# Patient Record
Sex: MALE | Race: WHITE | NOT HISPANIC OR LATINO
[De-identification: names, ages, dates, MRNs, and addresses within clinical notes are randomized per-mention and may not be internally consistent; named-entity substitution may affect disease eponyms.]

---

## 2024-02-27 PROBLEM — Z00.00 ENCOUNTER FOR PREVENTIVE HEALTH EXAMINATION: Status: ACTIVE | Noted: 2024-02-27

## 2024-03-01 ENCOUNTER — APPOINTMENT (OUTPATIENT)
Dept: ORTHOPEDIC SURGERY | Facility: CLINIC | Age: 44
End: 2024-03-01
Payer: COMMERCIAL

## 2024-03-01 VITALS — HEIGHT: 71 IN | BODY MASS INDEX: 23.8 KG/M2 | WEIGHT: 170 LBS

## 2024-03-01 DIAGNOSIS — M62.838 OTHER MUSCLE SPASM: ICD-10-CM

## 2024-03-01 DIAGNOSIS — Z78.9 OTHER SPECIFIED HEALTH STATUS: ICD-10-CM

## 2024-03-01 PROCEDURE — 72050 X-RAY EXAM NECK SPINE 4/5VWS: CPT

## 2024-03-01 PROCEDURE — 99204 OFFICE O/P NEW MOD 45 MIN: CPT

## 2024-03-01 RX ORDER — MELOXICAM 15 MG/1
15 TABLET ORAL DAILY
Qty: 21 | Refills: 0 | Status: ACTIVE | COMMUNITY
Start: 2024-03-01 | End: 1900-01-01

## 2024-03-01 NOTE — ASSESSMENT
[FreeTextEntry1] : 43 M with neck pain, periscapular pain and left cervical radic PT  We will also provide a prescription for anti-inflammatories.  Discussed major side effects of medication including but not limited to gastritis and acute kidney injury.  He was instructed to take with food and to discontinue use if stomach or esophageal pain developed. Fu 6 weeks If pain persists MRI C spine

## 2024-03-01 NOTE — IMAGING
[de-identified] : Spine: Inspection/Palpation: No tenderness to palpation throughout Cervical/thoracic/lumbar spine. No bony stepoffs, No lesions.   Gait: Non-antalgic, able to perform bilateral toe and heel rise.       Neurologic: Bilateral upper extremities 5/5 Deltoid/Biceps/Triceps/ Wrist Flexion/Wrist Extension/ / Intrinsics Except   Sensation intact to light touch C5-T1   Biceps/Triceps/Brachioradialis Reflex within normal limits   Negative Braxton's,  No inverted brachioradialis reflex    X-ray Ap/Lateral/Flexion/Extension of cervical spine were viewed and interpreted. calcified nucchal ligament. Kyphotic posture.  Corrects with extension. mild degen changes at C5-6 and C6-7

## 2024-03-01 NOTE — HISTORY OF PRESENT ILLNESS
[Upper back] : upper back [5] : 5 [7] : 7 [de-identified] : 03/01/2024: 43 year old RHD male presenting with left sided low back pain that radiates to left lateral arm not traveling past elbow.  Symptoms have been present x 2 months with No known injury/fall. Has been experiencing sharp/burning and tight/throbbing pain that worsens with standing for extended time.  denies N/T, denies chnage in dexterity or fine motor skills. Has tried ice hot patches with some relief. Worse pain when standing, more in the evening.   No PmHx, NKDA Occupation: - Heating.  [] : no

## 2024-04-05 ENCOUNTER — APPOINTMENT (OUTPATIENT)
Dept: ORTHOPEDIC SURGERY | Facility: CLINIC | Age: 44
End: 2024-04-05

## 2024-04-12 ENCOUNTER — NON-APPOINTMENT (OUTPATIENT)
Age: 44
End: 2024-04-12

## 2024-05-10 ENCOUNTER — APPOINTMENT (OUTPATIENT)
Dept: ORTHOPEDIC SURGERY | Facility: CLINIC | Age: 44
End: 2024-05-10
Payer: COMMERCIAL

## 2024-05-10 VITALS — HEIGHT: 71 IN | WEIGHT: 170 LBS | BODY MASS INDEX: 23.8 KG/M2

## 2024-05-10 DIAGNOSIS — M50.90 CERVICAL DISC DISORDER, UNSPECIFIED, UNSPECIFIED CERVICAL REGION: ICD-10-CM

## 2024-05-10 PROCEDURE — 99214 OFFICE O/P EST MOD 30 MIN: CPT

## 2024-05-10 RX ORDER — GABAPENTIN 100 MG/1
100 CAPSULE ORAL
Qty: 42 | Refills: 4 | Status: ACTIVE | COMMUNITY
Start: 2024-05-10 | End: 1900-01-01

## 2024-05-10 NOTE — IMAGING
[Straightening consistent with spasm] : Straightening consistent with spasm [Disc space narrowing] : Disc space narrowing [de-identified] : CSPINE Inspection: No rash or ecchymosis Palpation: spasm and TTP in traps, rhomboids, paracervicals ROM: Limited all planes Strength: 5/5 bilateral deltoid, biceps, 4/5 Left triceps, 4/5 Left wrist flexors, wrist extensors, 4/5 Left , abductors Sensation: Sensation present to light touch bilateral C5-T1 distributions Reflexes: Negative Braxton's bilaterally  Bilateral Shoulders- Palpation: No tenderness to palpation ROM: R shoulder in adduction , L shoulder in adduction  Strength: Decent strength with rotator cuff testing Provocative maneuvers: Negative impingement testing  Left Elbow - negative tinel's

## 2024-05-10 NOTE — ASSESSMENT
[FreeTextEntry1] : Left upper extremity radiculopathy with weakness  Gabapentin- Patient advised of sedating effects, instructed not to drive, operate machinery, or take with other sedating medications. Advised of need to taper on/off medication and risk of abruptly stopping gabapentin.  Cervical MRI to eval HNP  f/u after MRI

## 2024-05-10 NOTE — HISTORY OF PRESENT ILLNESS
[de-identified] : 3/1/24 Dr. Reid note:   43 year old RHD male presenting with left sided low back pain that radiates to left lateral arm not traveling past elbow. Symptoms have been present x 2 months with No known injury/fall. Has been experiencing sharp/burning and tight/throbbing pain that worsens with standing for extended time. denies N/T, denies chnage in dexterity or fine motor skills. Has tried ice hot patches with some relief. Worse pain when standing, more in the evening. 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 Patient has numbness/tingling into the hand. Went to PT, doing HEP.  [] : no

## 2024-05-21 ENCOUNTER — APPOINTMENT (OUTPATIENT)
Dept: MRI IMAGING | Facility: CLINIC | Age: 44
End: 2024-05-21
Payer: COMMERCIAL

## 2024-05-21 PROCEDURE — 72141 MRI NECK SPINE W/O DYE: CPT

## 2024-05-29 ENCOUNTER — APPOINTMENT (OUTPATIENT)
Dept: ORTHOPEDIC SURGERY | Facility: CLINIC | Age: 44
End: 2024-05-29
Payer: COMMERCIAL

## 2024-05-29 VITALS — HEIGHT: 71 IN | BODY MASS INDEX: 23.8 KG/M2 | WEIGHT: 170 LBS

## 2024-05-29 DIAGNOSIS — M54.12 RADICULOPATHY, CERVICAL REGION: ICD-10-CM

## 2024-05-29 PROCEDURE — 99215 OFFICE O/P EST HI 40 MIN: CPT

## 2024-05-29 NOTE — IMAGING
[de-identified] : CSPINE Inspection: No rash or ecchymosis Palpation: spasm and TTP in traps, rhomboids, paracervicals ROM: Limited all planes Strength: 5/5 bilateral deltoid, biceps, 4/5 Left triceps, 4/5 Left wrist flexors, wrist extensors, 4/5 Left , abductors Sensation: Sensation present to light touch bilateral C5-T1 distributions Reflexes: Negative Braxton's bilaterally  Bilateral Shoulders- Palpation: No tenderness to palpation ROM: R shoulder in adduction , L shoulder in adduction  Strength: Decent strength with rotator cuff testing Provocative maneuvers: Negative impingement testing  Left Elbow - negative tinel's [Disc space narrowing] : Disc space narrowing [No instability seen on flexion/extension] : No instability seen on flexion/extension

## 2024-05-29 NOTE — ASSESSMENT
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  Minimal spondylosis on XRs, no instability.   Indicating for cervical disc replacement C4-C7 Cervical Disc Replacement- We've discussed the surgery details including instrumentation and fusion as a last resort, including grafting options (local, allograft, ICBG, and biologics) as well as potential for complications including but not limited to pain, scar, bleeding and infection. There is also a possibility for hardware complication such as malposition of hardware, hardware loosening, pullout, failure or fracture of bone, adjacent segment disease, pseudarthrosis, heterotopic ossification, and need for future surgery. Finally, we discussed potential for injury to nerves, the spinal cord either transient or permanent, CSF leak, damage to blood vessels, paralysis, blindness, stroke, dysphagia, dysphonia, need for transfusion, and medical complications.  The patient verbalized understanding and all questions were answered.   Gabapentin- Patient advised of sedating effects, instructed not to drive, operate machinery, or take with other sedating medications. Advised of need to taper on/off medication and risk of abruptly stopping gabapentin.

## 2024-05-29 NOTE — HISTORY OF PRESENT ILLNESS
[de-identified] : 3/1/24 Dr. Reid note:   43 year old RHD male presenting with left sided low back pain that radiates to left lateral arm not traveling past elbow. Symptoms have been present x 2 months with No known injury/fall. Has been experiencing sharp/burning and tight/throbbing pain that worsens with standing for extended time. denies N/T, denies chnage in dexterity or fine motor skills. Has tried ice hot patches with some relief. Worse pain when standing, more in the evening. No PmHx, NKDA Occupation: - Heating.  5/21/24 Cervical MRI  - report noted in chart.   Ind. 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 etoh 5/10/24-  No reported injury, pain started about 4 months ago and travels down the LUE to the hand. Patient has numbness/tingling into the hand. Went to PT, doing HEP.  5/29/24- MRI f/u [] : no [FreeTextEntry5] :  patient is here to review MRI of the Neck.

## 2024-08-08 ENCOUNTER — APPOINTMENT (OUTPATIENT)
Dept: ORTHOPEDIC SURGERY | Facility: CLINIC | Age: 44
End: 2024-08-08

## 2024-08-08 PROBLEM — M19.019 ARTHRITIS OF STERNOCLAVICULAR JOINT: Status: ACTIVE | Noted: 2024-08-08

## 2024-08-08 PROBLEM — M25.511 PAIN OF RIGHT STERNOCLAVICULAR JOINT: Status: ACTIVE | Noted: 2024-08-08

## 2024-08-08 PROBLEM — M19.011 ARTHRITIS OF RIGHT ACROMIOCLAVICULAR JOINT: Status: ACTIVE | Noted: 2024-08-08

## 2024-08-08 PROBLEM — M25.811 SHOULDER IMPINGEMENT, RIGHT: Status: ACTIVE | Noted: 2024-08-08

## 2024-08-08 PROCEDURE — 73010 X-RAY EXAM OF SHOULDER BLADE: CPT | Mod: RT

## 2024-08-08 PROCEDURE — 73030 X-RAY EXAM OF SHOULDER: CPT | Mod: RT

## 2024-08-08 PROCEDURE — 71130 X-RAY STRENOCLAVIC JT 3/>VWS: CPT | Mod: RT

## 2024-08-08 PROCEDURE — 99213 OFFICE O/P EST LOW 20 MIN: CPT

## 2024-08-08 NOTE — DISCUSSION/SUMMARY
[Medication Risks Reviewed] : Medication risks reviewed [de-identified] :   Rx: Naproxen  Plan for PT Rx: voltaren gel f/u 6-8 weeks ----------------------------------------------------------------------------   All relevant imaging studies pertinent to today's visit, including x-rays, MRI's and/or other advanced imaging studies (CT/etc) were independently interpreted and reviewed with the patient as needed. Implications of the studies together with the patient's clinical picture were discussed to formulate a working diagnosis and management options were detailed.   The patient and/or guardian was advised of the diagnosis.  The natural history of the pathology was explained in full. All questions were answered.  The risks and benefits of conservative and interventional treatment alternatives were explained to the patient  The patient and/or guardian was advised if any advanced diagnostic/imaging study (MRI/CT/etc) is ordered to evaluate potential pathology in the affected area(s), they should follow up in the office to review the results of the study and determine further management that may be indicated.    ----------------------------------------------------------------------------  Patient warned of specific risks of medication related to bleeding, GI issues, increase blood pressure, and cardiac risks in addition to additional risks.  Patient advised to discuss with PMD  if any presence of stated issues.

## 2024-08-08 NOTE — HISTORY OF PRESENT ILLNESS
[de-identified] : 8/08/2024 patient comes in today for evaluation of their Right shoulder. patient states he felt pain in the shoulder for the past week after overuse. Patient states he feels clicking in the collarbone, denies N/T.  Pt reports having R anterior clavicle area pain x several weeks after doing push ups, Pt has hx of cervical HNP and radiculopathy [] : no

## 2024-08-08 NOTE — IMAGING
[Right] : right shoulder [AC Joint Arthrosis] : AC Joint Arthrosis [There are no fractures, subluxations or dislocations. No significant abnormalities are seen] : There are no fractures, subluxations or dislocations. No significant abnormalities are seen [de-identified] :   ----------------------------------------------------------------------------   Right shoulder exam:   Skin: no significant pertinent finding Inspection: no obvious deformity, no obvious masses, no swelling, no effusion, no atrophy ROM:    FF: 180    ER: 70    IR: T12 Tenderness:    (neg) Anterior/Biceps:    (neg) Posterior    (neg) Lateral    (neg) Trapezius    (neg) Scapula    (neg) AC joint    (neg) Crepitus with ROM     (+) SC joint tenderness Stability:    (neg) Translation    (neg) Apprehension    (neg) Clicking Additional tests:    (+) Neer's    (+)Hawkin's    (neg) Suarez's    (neg) Speed    (+) Cross chest adduction Strength:    FF: 5/5    ER: 5/5    IR: 5/5    Biceps: 5/5    Triceps: 5/5    Distal: 5/5 Neuro: In tact to light touch throughout Vascularity: Extremity warm and well perfused   [FreeTextEntry6] : SC arthritis with inferior bone spur

## 2024-08-19 RX ORDER — POVIDONE-IODINE 10 %
1 SOLUTION, NON-ORAL TOPICAL ONCE
Refills: 0 | Status: COMPLETED | OUTPATIENT
Start: 2024-08-22 | End: 2024-08-22

## 2024-08-22 ENCOUNTER — TRANSCRIPTION ENCOUNTER (OUTPATIENT)
Age: 44
End: 2024-08-22

## 2024-08-22 ENCOUNTER — INPATIENT (INPATIENT)
Facility: HOSPITAL | Age: 44
LOS: 0 days | Discharge: HOME | DRG: 552 | End: 2024-08-23
Attending: NEUROLOGICAL SURGERY | Admitting: NEUROLOGICAL SURGERY
Payer: COMMERCIAL

## 2024-08-22 VITALS
OXYGEN SATURATION: 100 % | SYSTOLIC BLOOD PRESSURE: 111 MMHG | RESPIRATION RATE: 16 BRPM | DIASTOLIC BLOOD PRESSURE: 82 MMHG | HEART RATE: 70 BPM | TEMPERATURE: 98 F | WEIGHT: 167.33 LBS | HEIGHT: 71 IN

## 2024-08-22 DIAGNOSIS — M48.02 SPINAL STENOSIS, CERVICAL REGION: ICD-10-CM

## 2024-08-22 LAB — BLD GP AB SCN SERPL QL: SIGNIFICANT CHANGE UP

## 2024-08-22 PROCEDURE — 99222 1ST HOSP IP/OBS MODERATE 55: CPT

## 2024-08-22 DEVICE — MAYFIELD SKULL PIN ADULT PLASTIC: Type: IMPLANTABLE DEVICE | Status: FUNCTIONAL

## 2024-08-22 DEVICE — SURGIFLO MATRIX WITH THROMBIN KIT: Type: IMPLANTABLE DEVICE | Status: FUNCTIONAL

## 2024-08-22 DEVICE — SURGIFOAM PAD 8CM X 12.5CM X 10MM (100): Type: IMPLANTABLE DEVICE | Status: FUNCTIONAL

## 2024-08-22 RX ORDER — SODIUM CHLORIDE 9 MG/ML
1000 INJECTION INTRAMUSCULAR; INTRAVENOUS; SUBCUTANEOUS
Refills: 0 | Status: COMPLETED | OUTPATIENT
Start: 2024-08-22 | End: 2024-08-22

## 2024-08-22 RX ORDER — OXYCODONE HYDROCHLORIDE 5 MG/1
5 TABLET ORAL EVERY 6 HOURS
Refills: 0 | Status: DISCONTINUED | OUTPATIENT
Start: 2024-08-22 | End: 2024-08-23

## 2024-08-22 RX ORDER — BENZOCAINE/MENTHOL 20 %-0.5 %
1 AEROSOL (GRAM) TOPICAL
Refills: 0 | Status: DISCONTINUED | OUTPATIENT
Start: 2024-08-22 | End: 2024-08-23

## 2024-08-22 RX ORDER — HYDROMORPHONE HYDROCHLORIDE 2 MG/1
0.5 TABLET ORAL EVERY 6 HOURS
Refills: 0 | Status: DISCONTINUED | OUTPATIENT
Start: 2024-08-22 | End: 2024-08-22

## 2024-08-22 RX ORDER — CEFAZOLIN SODIUM 2 G/100ML
2000 INJECTION, SOLUTION INTRAVENOUS EVERY 8 HOURS
Refills: 0 | Status: COMPLETED | OUTPATIENT
Start: 2024-08-22 | End: 2024-08-23

## 2024-08-22 RX ORDER — SODIUM CHLORIDE 9 MG/ML
3 INJECTION INTRAMUSCULAR; INTRAVENOUS; SUBCUTANEOUS EVERY 8 HOURS
Refills: 0 | Status: DISCONTINUED | OUTPATIENT
Start: 2024-08-22 | End: 2024-08-22

## 2024-08-22 RX ORDER — ACETAMINOPHEN 325 MG/1
975 TABLET ORAL ONCE
Refills: 0 | Status: COMPLETED | OUTPATIENT
Start: 2024-08-22 | End: 2024-08-22

## 2024-08-22 RX ORDER — SENNA 187 MG
2 TABLET ORAL AT BEDTIME
Refills: 0 | Status: DISCONTINUED | OUTPATIENT
Start: 2024-08-22 | End: 2024-08-23

## 2024-08-22 RX ORDER — HYDROMORPHONE HYDROCHLORIDE 2 MG/1
0.5 TABLET ORAL
Refills: 0 | Status: DISCONTINUED | OUTPATIENT
Start: 2024-08-22 | End: 2024-08-22

## 2024-08-22 RX ORDER — HYDROMORPHONE HYDROCHLORIDE 2 MG/1
0.5 TABLET ORAL EVERY 6 HOURS
Refills: 0 | Status: DISCONTINUED | OUTPATIENT
Start: 2024-08-22 | End: 2024-08-23

## 2024-08-22 RX ORDER — ACETAMINOPHEN 325 MG/1
1000 TABLET ORAL ONCE
Refills: 0 | Status: COMPLETED | OUTPATIENT
Start: 2024-08-22 | End: 2024-08-22

## 2024-08-22 RX ORDER — OXYCODONE HYDROCHLORIDE 5 MG/1
10 TABLET ORAL EVERY 6 HOURS
Refills: 0 | Status: DISCONTINUED | OUTPATIENT
Start: 2024-08-22 | End: 2024-08-23

## 2024-08-22 RX ORDER — ONDANSETRON 2 MG/ML
4 INJECTION, SOLUTION INTRAMUSCULAR; INTRAVENOUS ONCE
Refills: 0 | Status: DISCONTINUED | OUTPATIENT
Start: 2024-08-22 | End: 2024-08-22

## 2024-08-22 RX ORDER — DEXAMETHASONE 0.75 MG
4 TABLET ORAL EVERY 6 HOURS
Refills: 0 | Status: COMPLETED | OUTPATIENT
Start: 2024-08-22 | End: 2024-08-23

## 2024-08-22 RX ORDER — METHOCARBAMOL 750 MG/1
750 TABLET, FILM COATED ORAL EVERY 8 HOURS
Refills: 0 | Status: DISCONTINUED | OUTPATIENT
Start: 2024-08-22 | End: 2024-08-23

## 2024-08-22 RX ORDER — POLYETHYLENE GLYCOL 3350 17 G/17G
17 POWDER, FOR SOLUTION ORAL DAILY
Refills: 0 | Status: DISCONTINUED | OUTPATIENT
Start: 2024-08-22 | End: 2024-08-23

## 2024-08-22 RX ORDER — CEFAZOLIN SODIUM 2 G/100ML
2000 INJECTION, SOLUTION INTRAVENOUS ONCE
Refills: 0 | Status: DISCONTINUED | OUTPATIENT
Start: 2024-08-22 | End: 2024-08-22

## 2024-08-22 RX ORDER — FENTANYL CITRATE 50 UG/ML
25 INJECTION INTRAMUSCULAR; INTRAVENOUS
Refills: 0 | Status: DISCONTINUED | OUTPATIENT
Start: 2024-08-22 | End: 2024-08-22

## 2024-08-22 RX ORDER — CEFAZOLIN SODIUM 2 G/100ML
2000 INJECTION, SOLUTION INTRAVENOUS EVERY 8 HOURS
Refills: 0 | Status: DISCONTINUED | OUTPATIENT
Start: 2024-08-22 | End: 2024-08-22

## 2024-08-22 RX ORDER — ACETAMINOPHEN 325 MG/1
650 TABLET ORAL EVERY 6 HOURS
Refills: 0 | Status: DISCONTINUED | OUTPATIENT
Start: 2024-08-22 | End: 2024-08-23

## 2024-08-22 RX ADMIN — Medication 1 APPLICATION(S): at 10:21

## 2024-08-22 RX ADMIN — CEFAZOLIN SODIUM 2000 MILLIGRAM(S): 2 INJECTION, SOLUTION INTRAVENOUS at 21:15

## 2024-08-22 RX ADMIN — OXYCODONE HYDROCHLORIDE 10 MILLIGRAM(S): 5 TABLET ORAL at 18:26

## 2024-08-22 RX ADMIN — METHOCARBAMOL 750 MILLIGRAM(S): 750 TABLET, FILM COATED ORAL at 15:34

## 2024-08-22 RX ADMIN — HYDROMORPHONE HYDROCHLORIDE 0.5 MILLIGRAM(S): 2 TABLET ORAL at 22:01

## 2024-08-22 RX ADMIN — ACETAMINOPHEN 1000 MILLIGRAM(S): 325 TABLET ORAL at 16:30

## 2024-08-22 RX ADMIN — ACETAMINOPHEN 975 MILLIGRAM(S): 325 TABLET ORAL at 10:11

## 2024-08-22 RX ADMIN — HYDROMORPHONE HYDROCHLORIDE 0.5 MILLIGRAM(S): 2 TABLET ORAL at 15:30

## 2024-08-22 RX ADMIN — HYDROMORPHONE HYDROCHLORIDE 0.5 MILLIGRAM(S): 2 TABLET ORAL at 16:15

## 2024-08-22 RX ADMIN — HYDROMORPHONE HYDROCHLORIDE 0.5 MILLIGRAM(S): 2 TABLET ORAL at 15:55

## 2024-08-22 RX ADMIN — OXYCODONE HYDROCHLORIDE 10 MILLIGRAM(S): 5 TABLET ORAL at 19:26

## 2024-08-22 RX ADMIN — HYDROMORPHONE HYDROCHLORIDE 0.5 MILLIGRAM(S): 2 TABLET ORAL at 23:01

## 2024-08-22 RX ADMIN — HYDROMORPHONE HYDROCHLORIDE 0.5 MILLIGRAM(S): 2 TABLET ORAL at 15:17

## 2024-08-22 RX ADMIN — Medication 4 MILLIGRAM(S): at 23:43

## 2024-08-22 RX ADMIN — Medication 2 TABLET(S): at 21:17

## 2024-08-22 RX ADMIN — Medication 4 MILLIGRAM(S): at 18:26

## 2024-08-22 RX ADMIN — SODIUM CHLORIDE 75 MILLILITER(S): 9 INJECTION INTRAMUSCULAR; INTRAVENOUS; SUBCUTANEOUS at 18:36

## 2024-08-22 RX ADMIN — ACETAMINOPHEN 400 MILLIGRAM(S): 325 TABLET ORAL at 16:07

## 2024-08-22 NOTE — PHYSICAL THERAPY INITIAL EVALUATION ADULT - GENERAL OBSERVATIONS, REHAB EVAL
Pt received in bed in PACU + IV + tele//BP monitoring + VCBs, breathing on RA in NAD, in 8/10 posterior neck pain, agreeable to PT evaluation

## 2024-08-22 NOTE — PHYSICAL THERAPY INITIAL EVALUATION ADULT - PERTINENT HX OF CURRENT PROBLEM, REHAB EVAL
44 year old male with no significant PMH. He reports experiencing lower neck pain that radiates down his left arm to his left hand for 8 months that is progressively getting worse. Presents for scheduled left C6-T1 laminoforaminotomy. Now s/p Laminoforaminotomy of cervical spine by posterior approach 8/22.

## 2024-08-22 NOTE — BRIEF OPERATIVE NOTE - NSICDXBRIEFPROCEDURE_GEN_ALL_CORE_FT
PROCEDURES:  Laminoforaminotomy of cervical spine by posterior approach 16-Aug-2024 17:11:26  Afshan Álvarez

## 2024-08-22 NOTE — CONSULT NOTE ADULT - SUBJECTIVE AND OBJECTIVE BOX
Patient is a 44y old  Male who presents with a chief complaint of "I am having surgery on my cervical spine." (16 Aug 2024 16:40)      HPI:  44 year old male with no significant PMH. He reports experiencing lower neck pain that radiates down his left arm to his left hand for 8 months that is progressively getting worse. Denies injury or fall. He describes the pain at 8/10 aching, burning, sharp, and shooting with associated left ring and pinky finger numbness and tingling, left hand  weakness, and neck stiffness. Alleviating factors include rest. Aggravating factors include activity such as walking. Patient admits to attempting conservative therapies such as physical therapy and steroid injections (last injection 6/17/2024) that have not provided relief in pain. Patient denies changes in bowel or bladder habits. Presents for scheduled left C6-T1 laminoforaminotomy. Now s/p Laminoforaminotomy of cervical spine by posterior approach 8/22.   Medicine consulted for Post op medical co management.     Interval History:  Pt reports his pain is uncontrolled       PAST MEDICAL & SURGICAL HISTORY:  Cervical spinal stenosis      Social History:  Tabacco - No   ETOH - on weekends   Illicit drug abuse - Marijuana     FAMILY HISTORY:  FH: dementia (Father)        Allergies    No Known Allergies      HOME MEDICATIONS :   None       REVIEW OF SYSTEMS:    CONSTITUTIONAL: No weakness, fevers or chills  EYES/ENT: No visual changes;  No vertigo or throat pain   NECK: No pain or stiffness  RESPIRATORY: No cough, wheezing, hemoptysis; No shortness of breath  CARDIOVASCULAR: No chest pain or palpitations  GASTROINTESTINAL: No abdominal or epigastric pain. No nausea, vomiting, or hematemesis; No diarrhea or constipation. No melena or hematochezia.  GENITOURINARY: No dysuria, frequency or hematuria  NEUROLOGICAL: No numbness or weakness  Psych: No depression, anxiety  SKIN: No itching, rashes    MEDICATIONS  (STANDING):  acetaminophen   IVPB .. 1000 milliGRAM(s) IV Intermittent once  ceFAZolin  Injectable. 2000 milliGRAM(s) IV Push every 8 hours  dexAMETHasone  Injectable 4 milliGRAM(s) IV Push every 6 hours  multivitamin 1 Tablet(s) Oral daily  polyethylene glycol 3350 17 Gram(s) Oral daily  senna 2 Tablet(s) Oral at bedtime  sodium chloride 0.9%. 1000 milliLiter(s) (75 mL/Hr) IV Continuous <Continuous>    MEDICATIONS  (PRN):  acetaminophen     Tablet .. 650 milliGRAM(s) Oral every 6 hours PRN Mild Pain (1 - 3)  benzocaine/menthol Lozenge 1 Lozenge Oral every 2 hours PRN Sore Throat  fentaNYL    Injectable 25 MICROGram(s) IV Push every 5 minutes PRN Moderate Pain (4 - 6)  HYDROmorphone  Injectable 0.5 milliGRAM(s) IV Push every 10 minutes PRN Severe Pain (7 - 10)  methocarbamol 750 milliGRAM(s) Oral every 8 hours PRN Muscle Spasm  ondansetron Injectable 4 milliGRAM(s) IV Push once PRN Nausea and/or Vomiting  oxyCODONE    IR 5 milliGRAM(s) Oral every 6 hours PRN Moderate Pain (4 - 6)  oxyCODONE    IR 10 milliGRAM(s) Oral every 6 hours PRN Severe Pain (7 - 10)      Vital Signs Last 24 Hrs  T(C): 36.1 (22 Aug 2024 15:01), Max: 36.6 (22 Aug 2024 09:44)  T(F): 97 (22 Aug 2024 15:01), Max: 97.8 (22 Aug 2024 09:44)  HR: 61 (22 Aug 2024 15:30) (61 - 78)  BP: 122/79 (22 Aug 2024 15:30) (111/82 - 130/93)  BP(mean): 93 (22 Aug 2024 15:30) (93 - 100)  RR: 13 (22 Aug 2024 15:30) (11 - 16)  SpO2: 100% (22 Aug 2024 15:30) (96% - 100%)    Parameters below as of 22 Aug 2024 15:30  Patient On (Oxygen Delivery Method): room air        PHYSICAL EXAM:    CONSTITUTIONAL: Awake, alert and in no apparent distress  Neck: Posterior neck dressing   CARDIAC: Normal rate, regular rhythm.  Heart sounds S1, S2.    RESPIRATORY: Breath sounds clear and equal bilaterally.   ABDOMINAL: Nontender to palpation. No rebound/ guarding   EXTREMITIES: No edema, cyanosis or deformity   NEUROLOGICAL: Alert and oriented, no focal deficits, no motor or sensory deficits

## 2024-08-22 NOTE — PROGRESS NOTE ADULT - SUBJECTIVE AND OBJECTIVE BOX
POST-OPERATIVE NOTE    Procedure: L C6-T1 laminoforaminotomy     Diagnosis/Indication: Cervical disc herniation    Surgeon: Dr. Ronn Vazquez    INTERVAL HPI/ACUTE EVENTS:  44yM occasional marijuana smoker w/o significant pmhx presents with lower neck pain with radiation into L arm and hand w/ numbness in L ring and pinky finger now POD#0 s/p L C6-T1 laminoforaminotomy. Patient seen lying comfortably in bed. Pain controlled with medication.    VITALS:  T(C): 36.6 (08-22-24 @ 09:44), Max: 36.6 (08-22-24 @ 09:44)  HR: 70 (08-22-24 @ 09:44) (70 - 70)  BP: 111/82 (08-22-24 @ 09:44) (111/82 - 111/82)  RR: 16 (08-22-24 @ 09:44) (16 - 16)  SpO2: 100% (08-22-24 @ 09:44) (100% - 100%)  Wt(kg): --    PHYSICAL EXAM:  GENERAL: NAD, well-groomed, well-developed  HEAD:  S/p L crani. Dressing clean, dry, intact. Dried blood noted, not fully saturated.  DRAINS: Subgaleal/epidural/subdural drain to bulb suction/thumbprint suction/gravity. Serosanguinous drainage noted.  NECK: C-collar in place. Dressing clean, dry, intact  WOUND: Dressing clean dry intact  FANTA COMA SCORE: E- V- M- =       E: 4= opens eyes spontaneously 3= to voice 2= to noxious 1= no opening       V: 5= oriented 4= confused 3= inappropriate words 2= incomprehensible sounds 1= nonverbal 1T= intubated       M: 6= follows commands 5= localizes 4= withdraws 3= flexor posturing 2= extensor posturing 1= no movement  MENTAL STATUS: AAO x3; Awake/Comatose; Opens eyes spontaneously/to voice/to light touch/to noxious stimuli; Appropriately conversant without aphasia/Nonverbal; following simple commands/mimicking/not following commands  CRANIAL NERVES: Visual acuity normal for age, visual fields full to confrontation, PERRL. EOMI without nystagmus. Facial sensation intact V1-3 distribution b/l. Face symmetric w/ normal eye closure and smile, tongue midline. Hearing grossly intact. Speech clear. Head turning and shoulder shrug intact.   REFLEXES: PERRL. Corneals intact b/l. Gag intact. Cough intact. Oculocephalic reflex intact (Doll's eye). Negative Braxton's b/l. Negative clonus b/l  MOTOR: strength 5/5 b/l upper and lower extremities  Uppers     Delt (C5/6)     Bicep (C5/6)     Wrist Extend (C6)     Tricep (C7)     HG (C8/T1)  R                     5/5                 5/5                         5/5                           5/5                   5/5  L                      5/5                 5/5                         5/5                           5/5                   5/5  Lowers      HF(L1/L2)     KE (L3)     DF (L4)     EHL (L5)     PF (S1)      R                     5/5              5/5           5/5           5/5            5/5  L                     5/5               5/5          5/5            5/5            5/5  SENSATION: grossly intact to light touch all extremities  COORDINATION: Gait intact; rapid alternating movements intact; heel to shin intact; no upper extremity dysmetria  CHEST/LUNG: Clear to auscultation bilaterally; no rales, rhonchi, wheezing, or rubs  HEART: +S1/+S2; Regular rate and rhythm; no murmurs, rubs, or gallops  ABDOMEN: Soft, nontender, nondistended; bowel sounds present all four quadrants  EXTREMITIES:  2+ peripheral pulses, no clubbing, cyanosis, or edema  SKIN: Warm, dry; no rashes or lesions    LABS:            RADIOLOGY/OTHER:  Reviewed imaging from [facility]   POST-OPERATIVE NOTE    Procedure: L C6-T1 laminoforaminotomy     Diagnosis/Indication: Cervical disc herniation    Surgeon: Dr. Ronn Vazquez    INTERVAL HPI/ACUTE EVENTS:  44yM occasional marijuana smoker w/o significant pmhx presents with lower neck pain with radiation into L arm and hand w/ numbness in L ring and pinky finger now POD#0 s/p L C6-T1 laminoforaminotomy. Patient seen lying comfortably in bed. Reports some neck pain but decent control with medication.    VITALS:  T(C): 36.6 (08-22-24 @ 09:44), Max: 36.6 (08-22-24 @ 09:44)  HR: 70 (08-22-24 @ 09:44) (70 - 70)  BP: 111/82 (08-22-24 @ 09:44) (111/82 - 111/82)  RR: 16 (08-22-24 @ 09:44) (16 - 16)  SpO2: 100% (08-22-24 @ 09:44) (100% - 100%)  Wt(kg): --    PHYSICAL EXAM:  GENERAL: NAD  HEAD: Small amount of dried blood on sides of head from pin sites, no active bleeding  NECK: Dressing c/d/i  FANTA COMA SCORE: E- V- M- = 15       E: 4= opens eyes spontaneously 3= to voice 2= to noxious 1= no opening       V: 5= oriented 4= confused 3= inappropriate words 2= incomprehensible sounds 1= nonverbal 1T= intubated       M: 6= follows commands 5= localizes 4= withdraws 3= flexor posturing 2= extensor posturing 1= no movement  MENTAL STATUS: AAO x3; Awake; Opens eyes spontaneously; Appropriately conversant without aphasia; following simple commands  CRANIAL NERVES: PERRL. EOMI without nystagmus. Facial sensation intact V1-3 distribution b/l. Face symmetric w/ normal eye closure and smile, tongue midline. Hearing grossly intact. Speech clear. Shoulder shrug intact.   MOTOR: strength 5/5 b/l upper and lower extremities  SENSATION: grossly intact to light touch all extremities  COORDINATION: Gait testing deferred  CHEST/LUNG: Nonlabored on room air, no accessory muscle use  SKIN: Warm, dry    LABS:            RADIOLOGY/OTHER:  Reviewed imaging from [facility]

## 2024-08-22 NOTE — PHYSICAL THERAPY INITIAL EVALUATION ADULT - ADDITIONAL COMMENTS
Pt reports living in private home with wife and 2 kids. 2 DONALD no handrail and no stairs inside. Independent prior to admission. Owns no DME

## 2024-08-22 NOTE — CONSULT NOTE ADULT - ASSESSMENT
44 year old male with no significant PMH. He reports experiencing lower neck pain that radiates down his left arm to his left hand for 8 months that is progressively getting worse. Presents for scheduled left C6-T1 laminoforaminotomy. Now s/p Laminoforaminotomy of cervical spine by posterior approach 8/22.   Medicine consulted for Post op medical co management.     Spinal stenosis, cervical region  - s/p Laminoforaminotomy of cervical spine  - Pain control     High BP reading   - in PACU /110   - Likely 2/2 uncontrolled pain   - Monitor BP, will review trend once pain controlled    DVT ppx - per primary team

## 2024-08-23 ENCOUNTER — TRANSCRIPTION ENCOUNTER (OUTPATIENT)
Age: 44
End: 2024-08-23

## 2024-08-23 VITALS
HEART RATE: 87 BPM | OXYGEN SATURATION: 96 % | RESPIRATION RATE: 18 BRPM | DIASTOLIC BLOOD PRESSURE: 75 MMHG | SYSTOLIC BLOOD PRESSURE: 117 MMHG | TEMPERATURE: 98 F

## 2024-08-23 LAB
ANION GAP SERPL CALC-SCNC: 11 MMOL/L — SIGNIFICANT CHANGE UP (ref 5–17)
BASOPHILS # BLD AUTO: 0.01 K/UL — SIGNIFICANT CHANGE UP (ref 0–0.2)
BASOPHILS NFR BLD AUTO: 0.1 % — SIGNIFICANT CHANGE UP (ref 0–2)
BUN SERPL-MCNC: 12 MG/DL — SIGNIFICANT CHANGE UP (ref 8–20)
CALCIUM SERPL-MCNC: 8.9 MG/DL — SIGNIFICANT CHANGE UP (ref 8.4–10.5)
CHLORIDE SERPL-SCNC: 104 MMOL/L — SIGNIFICANT CHANGE UP (ref 96–108)
CO2 SERPL-SCNC: 23 MMOL/L — SIGNIFICANT CHANGE UP (ref 22–29)
CREAT SERPL-MCNC: 0.74 MG/DL — SIGNIFICANT CHANGE UP (ref 0.5–1.3)
EGFR: 113 ML/MIN/1.73M2 — SIGNIFICANT CHANGE UP
EOSINOPHIL # BLD AUTO: 0 K/UL — SIGNIFICANT CHANGE UP (ref 0–0.5)
EOSINOPHIL NFR BLD AUTO: 0 % — SIGNIFICANT CHANGE UP (ref 0–6)
GLUCOSE SERPL-MCNC: 153 MG/DL — HIGH (ref 70–99)
HCT VFR BLD CALC: 43.2 % — SIGNIFICANT CHANGE UP (ref 39–50)
HGB BLD-MCNC: 15.4 G/DL — SIGNIFICANT CHANGE UP (ref 13–17)
IMM GRANULOCYTES NFR BLD AUTO: 0.5 % — SIGNIFICANT CHANGE UP (ref 0–0.9)
LYMPHOCYTES # BLD AUTO: 0.68 K/UL — LOW (ref 1–3.3)
LYMPHOCYTES # BLD AUTO: 4.9 % — LOW (ref 13–44)
MAGNESIUM SERPL-MCNC: 2 MG/DL — SIGNIFICANT CHANGE UP (ref 1.6–2.6)
MCHC RBC-ENTMCNC: 31.3 PG — SIGNIFICANT CHANGE UP (ref 27–34)
MCHC RBC-ENTMCNC: 35.6 GM/DL — SIGNIFICANT CHANGE UP (ref 32–36)
MCV RBC AUTO: 87.8 FL — SIGNIFICANT CHANGE UP (ref 80–100)
MONOCYTES # BLD AUTO: 0.73 K/UL — SIGNIFICANT CHANGE UP (ref 0–0.9)
MONOCYTES NFR BLD AUTO: 5.3 % — SIGNIFICANT CHANGE UP (ref 2–14)
NEUTROPHILS # BLD AUTO: 12.4 K/UL — HIGH (ref 1.8–7.4)
NEUTROPHILS NFR BLD AUTO: 89.2 % — HIGH (ref 43–77)
PHOSPHATE SERPL-MCNC: 3.3 MG/DL — SIGNIFICANT CHANGE UP (ref 2.4–4.7)
PLATELET # BLD AUTO: 245 K/UL — SIGNIFICANT CHANGE UP (ref 150–400)
POTASSIUM SERPL-MCNC: 4.4 MMOL/L — SIGNIFICANT CHANGE UP (ref 3.5–5.3)
POTASSIUM SERPL-SCNC: 4.4 MMOL/L — SIGNIFICANT CHANGE UP (ref 3.5–5.3)
RBC # BLD: 4.92 M/UL — SIGNIFICANT CHANGE UP (ref 4.2–5.8)
RBC # FLD: 11.6 % — SIGNIFICANT CHANGE UP (ref 10.3–14.5)
SODIUM SERPL-SCNC: 138 MMOL/L — SIGNIFICANT CHANGE UP (ref 135–145)
WBC # BLD: 13.89 K/UL — HIGH (ref 3.8–10.5)
WBC # FLD AUTO: 13.89 K/UL — HIGH (ref 3.8–10.5)

## 2024-08-23 PROCEDURE — 86900 BLOOD TYPING SEROLOGIC ABO: CPT

## 2024-08-23 PROCEDURE — 86850 RBC ANTIBODY SCREEN: CPT

## 2024-08-23 PROCEDURE — 85025 COMPLETE CBC W/AUTO DIFF WBC: CPT

## 2024-08-23 PROCEDURE — 86901 BLOOD TYPING SEROLOGIC RH(D): CPT

## 2024-08-23 PROCEDURE — 36415 COLL VENOUS BLD VENIPUNCTURE: CPT

## 2024-08-23 PROCEDURE — 76000 FLUOROSCOPY <1 HR PHYS/QHP: CPT

## 2024-08-23 PROCEDURE — C1889: CPT

## 2024-08-23 PROCEDURE — C1713: CPT

## 2024-08-23 PROCEDURE — 83735 ASSAY OF MAGNESIUM: CPT

## 2024-08-23 PROCEDURE — 84100 ASSAY OF PHOSPHORUS: CPT

## 2024-08-23 PROCEDURE — 80048 BASIC METABOLIC PNL TOTAL CA: CPT

## 2024-08-23 RX ORDER — ACETAMINOPHEN 325 MG/1
2 TABLET ORAL
Qty: 0 | Refills: 0 | DISCHARGE
Start: 2024-08-23

## 2024-08-23 RX ORDER — ENOXAPARIN SODIUM 100 MG/ML
40 INJECTION SUBCUTANEOUS EVERY 24 HOURS
Refills: 0 | Status: DISCONTINUED | OUTPATIENT
Start: 2024-08-23 | End: 2024-08-23

## 2024-08-23 RX ORDER — SENNA 187 MG
2 TABLET ORAL
Qty: 28 | Refills: 0
Start: 2024-08-23 | End: 2024-09-05

## 2024-08-23 RX ORDER — METHOCARBAMOL 750 MG/1
1 TABLET, FILM COATED ORAL
Qty: 42 | Refills: 0
Start: 2024-08-23 | End: 2024-09-05

## 2024-08-23 RX ORDER — OXYCODONE HYDROCHLORIDE 5 MG/1
1 TABLET ORAL
Qty: 56 | Refills: 0
Start: 2024-08-23 | End: 2024-09-05

## 2024-08-23 RX ADMIN — OXYCODONE HYDROCHLORIDE 10 MILLIGRAM(S): 5 TABLET ORAL at 05:54

## 2024-08-23 RX ADMIN — POLYETHYLENE GLYCOL 3350 17 GRAM(S): 17 POWDER, FOR SOLUTION ORAL at 11:41

## 2024-08-23 RX ADMIN — HYDROMORPHONE HYDROCHLORIDE 0.5 MILLIGRAM(S): 2 TABLET ORAL at 09:20

## 2024-08-23 RX ADMIN — CEFAZOLIN SODIUM 2000 MILLIGRAM(S): 2 INJECTION, SOLUTION INTRAVENOUS at 05:49

## 2024-08-23 RX ADMIN — METHOCARBAMOL 750 MILLIGRAM(S): 750 TABLET, FILM COATED ORAL at 05:54

## 2024-08-23 RX ADMIN — OXYCODONE HYDROCHLORIDE 10 MILLIGRAM(S): 5 TABLET ORAL at 16:08

## 2024-08-23 RX ADMIN — Medication 4 MILLIGRAM(S): at 05:50

## 2024-08-23 RX ADMIN — Medication 1 TABLET(S): at 11:41

## 2024-08-23 RX ADMIN — HYDROMORPHONE HYDROCHLORIDE 0.5 MILLIGRAM(S): 2 TABLET ORAL at 08:58

## 2024-08-23 RX ADMIN — OXYCODONE HYDROCHLORIDE 10 MILLIGRAM(S): 5 TABLET ORAL at 06:54

## 2024-08-23 RX ADMIN — Medication 4 MILLIGRAM(S): at 11:41

## 2024-08-23 NOTE — DISCHARGE NOTE PROVIDER - NSDCQMAMI_CARD_ALL_CORE
From: Rogelio Snider  To: Jason Wisdom  Sent: 6/3/2024 12:38 PM CDT  Subject: Update on lexapro    Hello Dr. Wisdom,    My apologies for the delay in this message. I have been distracted lately with certain commitments back in [place], but I wanted to reach out to you with an update for my last lexapro prescription. After going through my previous refill, I hadn't been feeling entirely anxious or depressed since my last intake, and had been doing quite well. However, recently I have still been having some feelings of anxiety with slight episodes of depression. I think I’d want to keep my dosage at the same level as my last refill. If this can be done or if you would like to see me in person, definitely let me know.     In addition, I may also schedule an annual check-up this summer, just to see if I’m doing health wise as well.     Thank you.    No

## 2024-08-23 NOTE — DISCHARGE NOTE PROVIDER - CARE PROVIDER_API CALL
Ronn Vazquez.  Neurosurgery  [address], [address]  [place] [address]  Phone: [phone]  Fax: [phone]  Follow Up Time:    Ronn Vazquez.  Neurosurgery  [address], [address]  [place] [address]  Phone: [phone]  Fax: [phone]  Follow Up Time: 2 weeks

## 2024-08-23 NOTE — OCCUPATIONAL THERAPY INITIAL EVALUATION ADULT - ADDITIONAL COMMENTS
Pt has a tub with curtains and no grab bars but has the suction ones he can install if needed. Pt owns no DME. Pt is R handed to feed self, L handed for martha hygiene and drives. Pt spouse able to assist upon discharge

## 2024-08-23 NOTE — DISCHARGE NOTE PROVIDER - NSDCFUSCHEDAPPT_GEN_ALL_CORE_FT
Gerardo Demarco  [facility]  [address]  Scheduled Appointment: 10/03/2024

## 2024-08-23 NOTE — DISCHARGE NOTE PROVIDER - NSDCFUADDINST_GEN_ALL_CORE_FT
Please make an appointment for follow up with neurosurgeon Dr. Ronn Vazquez' office in 1 week for wound check. You have dissolvable sutures which will not need to be removed. Ok to shower starting today but keep incision site covered with the clear tegaderm dressing until post-op day #3 (8/25/24). On day 3 you may remove the clear dressing and small piece of gauze. Underneath will be small white bandages (steri-strips), do NOT remove these. The steri-strips will fall off on their own or will be removed by Dr. Vazquez in 1 week at your follow-up appointment. When showering with the steri-strips on do not scrub incision site and do not submerge in water for prolonged period of time. Gently pat dry after shower.    Medications:  You may use ice packs and over the counter lidocaine patches for mild pain relief   Please take Ibuprofen (Advil, Aleve, etc.) as directed for mild pain (do not exceed 3200mg per day)    The following prescriptions were sent to your pharmacy:  - Tramadol 50mg every 6 hours for MODERATE to SEVERE pain   - Methocarbamol 500mg every 8 hours as needed for muscle spasms  **These medications can cause drowsiness, please do not operate any heavy machinery if taking these**    NO heavy lifting, strenuous activity, twisting, bending, driving, or working until cleared by your physician.  Return to ER immediately for any of the following: fever, bleeding, new onset numbness/tingling/weakness, nausea and/or vomiting, chest pain, shortness of breath, confusion, seizure, altered mental status, urinary and/or fecal incontinence or retention. Please make an appointment for follow up with neurosurgeon Dr. Ronn Vazquez' office in 1 week for wound check. You have dissolvable sutures which will not need to be removed. Ok to shower starting today but keep incision site covered with the clear tegaderm dressing until post-op day #3 (8/25/24). On day 3 you may remove the clear dressing and small piece of gauze. Underneath will be small white bandages (steri-strips), do NOT remove these. The steri-strips will fall off on their own or will be removed by Dr. Vazquez in 1 week at your follow-up appointment. When showering with the steri-strips on do not scrub incision site and do not submerge in water for prolonged period of time. Gently pat dry after shower.    Medications:  You may use ice packs and over the counter lidocaine patches for mild pain relief   Please take Ibuprofen (Advil, Aleve, etc.) as directed for mild pain (do not exceed 3200mg per day)    The following prescriptions were sent to your pharmacy:  - Oxycodone 5mg every 6 hours for MODERATE to SEVERE pain   - Methocarbamol 500mg every 8 hours as needed for muscle spasms  **These medications can cause drowsiness, please do not operate any heavy machinery if taking these**    NO heavy lifting, strenuous activity, twisting, bending, driving, or working until cleared by your physician.  Return to ER immediately for any of the following: fever, bleeding, new onset numbness/tingling/weakness, nausea and/or vomiting, chest pain, shortness of breath, confusion, seizure, altered mental status, urinary and/or fecal incontinence or retention.

## 2024-08-23 NOTE — OCCUPATIONAL THERAPY INITIAL EVALUATION ADULT - DIAGNOSIS, OT EVAL
44 year old Male with no significant PMH. Pt reports lower neck pain that radiates down his L arm to his L hand for 8 months that is progressively getting worse. Pt now s/p L C6-T1 laminoforaminotomy of cervical spine by posterior approach 8/22.

## 2024-08-23 NOTE — OCCUPATIONAL THERAPY INITIAL EVALUATION ADULT - LIVES WITH, PROFILE
Pt reports lives in a house with his spouse and 2 sons (ages 10 and 7). 2 DONALD no handrail, no steps inside/children/spouse

## 2024-08-23 NOTE — DISCHARGE NOTE NURSING/CASE MANAGEMENT/SOCIAL WORK - NSDCPEFALRISK_GEN_ALL_CORE
For information on Fall & Injury Prevention, visit: [email or web address] OR  [email or web address] OR  https://www.cdc.gov/steadi/patient.html

## 2024-08-23 NOTE — PROGRESS NOTE ADULT - SUBJECTIVE AND OBJECTIVE BOX
HPI:  Jose Luis Singh is a 44 year old male with no significant PMH. He reports experiencing lower neck pain that radiates down his left arm to his left hand for 8 months that is progressively getting worse. Denies injury or fall. He describes the pain at 8/10 aching, burning, sharp, and shooting with associated left ring and pinky finger numbness and tingling, left hand  weakness, and neck stiffness. Alleviating factors include rest. Aggravating factors include activity such as walking. Patient admits to attempting conservative therapies such as physical therapy and steroid injections (last injection 6/17/2024) that have not provided relief in pain. Patient denies changes in bowel or bladder habits. Patient was seen today at [facility] with spinal stenosis cervical region for scheduled left C6-T1 laminoforaminotomy, possible microdiscectomy with Dr. Vazquez on 8/22/24.  (16 Aug 2024 16:40)    Hospital Course:  8/22 Left Laminoformainotomy of the cervical spine    Overnight Events:   None    43 yo male seen and examined in bed, NAD. Patient reports improvement in his pre-op left arm pain. He denies any new numbness, tingling or weakness in his extremities.       Vital Signs Last 24 Hrs  T(C): 36.6 (23 Aug 2024 09:00), Max: 36.6 (23 Aug 2024 09:00)  T(F): 97.8 (23 Aug 2024 09:00), Max: 97.8 (23 Aug 2024 09:00)  HR: 103 (23 Aug 2024 09:00) (61 - 103)  BP: 111/67 (23 Aug 2024 09:00) (109/66 - 130/93)  BP(mean): 93 (22 Aug 2024 15:30) (93 - 100)  RR: 18 (23 Aug 2024 09:00) (11 - 20)  SpO2: 94% (23 Aug 2024 09:00) (92% - 100%)    Parameters below as of 23 Aug 2024 09:00  Patient On (Oxygen Delivery Method): room air    PHYSICAL EXAM:  GENERAL: NAD, well-groomed, well-developed  HEAD:  Atraumatic, normocephalic  WOUND: Dressing clean dry intact  MENTAL STATUS: AAO x3; Awake, Opens eyes spontaneously, Appropriately conversant without aphasia, following simple commands  CRANIAL NERVES: PERRL; No facial asymmetry  MOTOR: strength 5/5 B/L upper and lower extremities; sensation grossly intact all extremities  CHEST/LUNG: NAD  SKIN: Warm, dry; no rashes or lesions    LABS:                        15.4   13.89 )-----------( 245      ( 23 Aug 2024 06:28 )             43.2     08-23    138  |  104  |  12.0  ----------------------------<  153<H>  4.4   |  23.0  |  0.74    Ca    8.9      23 Aug 2024 06:28  Phos  3.3     08-23  Mg     2.0     08-23        Urinalysis Basic - ( 23 Aug 2024 06:28 )    Color: x / Appearance: x / SG: x / pH: x  Gluc: 153 mg/dL / Ketone: x  / Bili: x / Urobili: x   Blood: x / Protein: x / Nitrite: x   Leuk Esterase: x / RBC: x / WBC x   Sq Epi: x / Non Sq Epi: x / Bacteria: x    08-22 @ 07:01  -  08-23 @ 07:00  --------------------------------------------------------  IN: 600 mL / OUT: 800 mL / NET: -200 mL    RADIOLOGY & ADDITIONAL TESTS:  Outpatient imaging

## 2024-08-23 NOTE — OCCUPATIONAL THERAPY INITIAL EVALUATION ADULT - WEIGHT-BEARING RESTRICTIONS: STAND/SIT, REHAB EVAL
Mediterranean Diet  What is the Mediterranean Diet? The Mediterranean Diet is a healthy and enjoyable way of eating based off the foods of Mediterranean countries, including [place], [place], and [place]. It consists mostly of mostly of plant-based foods, along with small amounts of lean meats and other animal foods. Olive oil is the main source of fat used for cooking and preparing foods.     Key parts of the Mediterranean Diet are:   Eating foods mostly from plant sources, such as fruits and vegetables, whole grains, legumes, nuts and seeds.    Choosing mainly whole, fresh foods. Limit processed foods as best you can.    Replacing butter and margarine with healthy fats, such as olive oil and nut butters.    Using herbs and spices instead of salt to flavor foods.    Eating fish and poultry at least twice weekly.    Eating small amounts of cheese and yogurt daily.    Eating eggs 2-3 times per week. Cook eggs in healthy ways.    Choosing fruit for dessert. Limit sweets high in added sugars to 2-3 times per month.    Eating small portions of red meat only 2-3 times per month.    Eating little to no processed meats like raymundo, sausage, bologna, salami, ham, etc.    Drinking a daily glass of wine with a meal. Only drink wine if your doctor says it is safe to do so.    Sharing healthy meals as a celebration with family and friends.    Regular physical activity at a level that promotes a healthy weight, fitness and well-being.     Why should I follow the Mediterranean Diet?   Following this diet lowers the risk for heart attack, stroke, and death from heart-related problems by 30%.      Other health benefits you may see are:    Lower blood pressure    Lower total cholesterol and LDL “lousy” cholesterol    Reduced risk for Type 2 diabetes and improved blood glucose control    Help with weight loss and weight maintenance    May protect brain health while aging    Reduced risk for depression     What is the Mediterranean Diet  Pyramid?    Foods at the base of the pyramid--which include whole grains, vegetables, fruit, legumes, and nuts--are to be eaten most often and form the base of meals.    Fish, poultry, eggs, cheese, and dairy are near the middle of the pyramid. They can be eaten regularly, but in smaller portions.    Red meat and sweets are at the top of the pyramid. They should be eaten only 2-3 times per month and in small amounts.    Water should be the most common drink each day.    Wine is to be drank in moderation (1-2 drinks per day) and only if allowed by your doctor. Alcohol in large amounts is not good for your heart.                            10 Tips to Get You Started:  1. Eat lots of vegetables. Vegetables are a flavorful and delicious part of the Mediterranean Diet--think colorful salads, fragrant soups, and veggies drizzled with olive oil. Aim for 3 or more servings of vegetables each day in a variety of colors.     2. Think of fresh fruit for dessert. Make fresh fruit your go to sweet treat and aim for 2 servings of fruit each day. Save sweets like cookies and ice cream for a special treat. If you have a sweet tooth, dark chocolate with at least 70% cocoa is a great heart-healthy sweet treat when eaten in small amounts.     3. Cook a plant-based meal one night a week or more. Build meals around beans, whole grains, and vegetables. Use herbs, spices, and olive oil to boost flavor.     4. Do the whole grain switch. Whole grains are rich in nutrients for your heart and the fiber will help keep you satisfied longer. Start by trading white rice for brown, black or red rice. Other great whole grain options are oatmeal, quinoa, and even popcorn! Choose whole grain breads, cereals, and crackers made with whole grain flour.     5. Think of meat as a side dish. Eat smaller amounts and use lean cuts of meat when possible, such as small strips of sirloin in a stir-grimes. Limit red meat to 2-3 times per month or less.     6. Enjoy  small amounts of dairy. Choose Greek or plain yogurt topped with honey or fresh fruit. Enjoy cheese in small amounts as a snack or sprinkled on food as a flavor boost.     7. Eat seafood twice a week. Fish such as salmon, tuna, herring, and sardines are rich in omega-3 fatty acids, which benefit your brain and heart.     8. Use healthy fats. Include sources of healthy fats in daily meals, especially extra-virgin olive oil, nuts, peanuts, seeds, olives, and avocados to add flavor and to help you feel satisfied longer. Use olive oil for cooking, marinades, and salad dressings. Nuts, seeds, olives & avocados are great flavor boosters on salads, sandwiches as well as side dishes.     9. Keep salt/sodium low and flavor high. Instead of relying on salt, boost the flavor of food with herbs and spices. [place] to see which flavor combinations you like best. As you cut down on salt and sodium, your taste buds will slowly adjust.     10. Be smart with portions and manage cravings. Balance the food you eat with physical activity and movement. The more you move each day, the more you can eat. Even healthy foods have calories, so keep those portion sizes in check!     Cravings will come and go throughout the day. Instead of eating right away, ask yourself if you’re truly hungry, drink water, and wait 15 to 20 minutes.     Recommended Web sites for Mediterranean recipes https://[employer].org/ https://www.[facility].org/     For a list of [facility] with a dietitian: [phone] [facility]: [phone] The information presented is intended for general information and educational purposes. It is not intended to replace the advice of your health care provider. Contact your health care provider if you believe you have a health problem.    ?[email or web address] X number/FileName: [id number] Edition Date: (12/2021) Created on: (7/2003)     weight-bearing as tolerated

## 2024-08-23 NOTE — OCCUPATIONAL THERAPY INITIAL EVALUATION ADULT - GENERAL OBSERVATIONS, REHAB EVAL
Left pt as received semifowler in bed, NAD, +IV lock on RA A&Ox4. Pre/post pain level 5/10, back/neck; RN aware, pain meds on board. Pt agreeable to OT evaluation

## 2024-08-23 NOTE — DISCHARGE NOTE PROVIDER - NSDCMRMEDTOKEN_GEN_ALL_CORE_FT
acetaminophen 325 mg oral tablet: 2 tab(s) orally every 6 hours As needed Mild Pain (1 - 3)  methocarbamol 750 mg oral tablet: 1 tab(s) orally every 8 hours as needed for Muscle Spasm  oxyCODONE 5 mg oral tablet: 1 tab(s) orally every 6 hours as needed for Moderate Pain (4 - 6) Take 1-2 tabs by mouth every 6 hours as needed for pain MDD: 8 tabs  senna leaf extract oral tablet: 2 tab(s) orally once a day (at bedtime) as needed for  constipation Use while taking narcotic medication

## 2024-08-23 NOTE — DISCHARGE NOTE NURSING/CASE MANAGEMENT/SOCIAL WORK - PATIENT PORTAL LINK FT
You can access the FollowMyHealth Patient Portal offered by [facility] by registering at the following website: [email or web address]. By joining [facility]’s FollowMyHealth portal, you will also be able to view your health information using other applications (apps) compatible with our system.

## 2024-08-23 NOTE — DISCHARGE NOTE PROVIDER - NSDCCPTREATMENT_GEN_ALL_CORE_FT
PRINCIPAL PROCEDURE  Procedure: Laminoforaminotomy of cervical spine by posterior approach  Findings and Treatment: C6-T1

## 2024-08-23 NOTE — PROGRESS NOTE ADULT - ASSESSMENT
43 yo male s/p a Left C6-T1 laminoforaminotomy of the cervical spine    Plan:  -Pain is controlled with oral medication. Patient has tolerated a diet adn is passing gas. Plan for him to be discharged today. 
ASSESSMENT:  44yM occasional marijuana smoker w/o significant pmhx presents with lower neck pain with radiation into L arm and hand w/ numbness in L ring and pinky finger now POD#0 s/p L C6-T1 laminoforaminotomy.    PLAN:    - Q4h neuro checks  - Incision closed with monocryl sutures, covered with gauze and tegaderm; keep dressing on until POD#3, patient may shower starting POD#0  - No post-operative imaging required, will obtain in outpatient setting  - Pain control: Tylenol, oxycodone 5/10mg PRN  - Decadron 4q6 x 4 doses  - Robaxin 750mg q8h PRN for muscle spasm  - SBP goal   - No zimmerman during case, monitor for post-op voiding  - Bowel regimen: senna, miralax, record BM  - Antibiotics x3 doses   - DVT ppx: SCDs, Lovenox at 10pm starting POD#1 if still here  - HOB 30 degrees  - PT/OT: mobilize as tolerated  - Plan discussed with Dr. Vazquez

## 2024-08-23 NOTE — DISCHARGE NOTE PROVIDER - HOSPITAL COURSE
45 y/o male admitted to hospital on 8/22/2024 for Left C6-T1 laminoforaminotomy by Dr. Vazquez. Patient tolerated procedure well and progressed appropriately. Currently tolerating regular diet, voiding independently, having bowel movements and ambulating. Neurosurgery, medicine, PT/OT followed the patient's course. On 8/23/2024, pt deemed medically and surgically stable for discharge. Discharged with home medications, incentive spirometer and pain medications to follow-up with Dr. Vazquez in 7-14 days. Instructions, medications, and hospital course was discussed with patient and/or family prior to discharge.

## 2024-10-03 ENCOUNTER — APPOINTMENT (OUTPATIENT)
Dept: ORTHOPEDIC SURGERY | Facility: CLINIC | Age: 44
End: 2024-10-03

## (undated) DEVICE — WARMING BLANKET UPPER ADULT

## (undated) DEVICE — DRAPE XL SHEET 77X98"

## (undated) DEVICE — PACK NEURO

## (undated) DEVICE — FRAZIER CONNECTING TUBE 2FT 5MM

## (undated) DEVICE — POSITIONER FOAM LAMINECTOMY ARM CRADLE (PINK)

## (undated) DEVICE — DRAPE C ARM UNIVERSAL

## (undated) DEVICE — SOL IRR POUR NS 0.9% 1000ML

## (undated) DEVICE — CATH IV SAFE BC 18G X 1.16" (GREEN)

## (undated) DEVICE — ELCTR ROCKER SWITCH PENCIL BLUE 10FT

## (undated) DEVICE — SUT MONOCRYL 4-0 27" PS-2 UNDYED

## (undated) DEVICE — GLV 7.5 PROTEXIS (WHITE)

## (undated) DEVICE — DRSG TELFA 3 X 4

## (undated) DEVICE — SUT VICRYL 2-0 18" CP-2 UNDYED (POP-OFF)

## (undated) DEVICE — POSITIONER JACKSON TABLE HEADREST 7", CHEST, HIP, THIGH PADS, ARM CRADLE

## (undated) DEVICE — VENODYNE/SCD SLEEVE CALF MEDIUM

## (undated) DEVICE — ELCTR BOVIE PENCIL BLADE 10FT

## (undated) DEVICE — SOL IRR POUR H2O 1000ML

## (undated) DEVICE — ELCTR GROUNDING PAD ADULT COVIDIEN

## (undated) DEVICE — DRAPE 3/4 SHEET WITH ADHESIVE 76" X 60"

## (undated) DEVICE — DRAPE TOWEL BLUE 17" X 24"

## (undated) DEVICE — DRAPE INSTRUMENT POUCH 6.75" X 11"

## (undated) DEVICE — DRAPE 3/4 SHEET 52X76"

## (undated) DEVICE — POSITIONER FOAM EGG CRATE ULNAR 2PCS (PINK)

## (undated) DEVICE — TUBING FOR SMOKE EVACUATOR (PURPLE END)

## (undated) DEVICE — DRAPE TOWEL 1000 SMALL 17" X 11"

## (undated) DEVICE — MARKING PEN W RULER

## (undated) DEVICE — MIDAS REX MR8 MATCH HEAD FLUTED LG BORE 3MM X 14CM

## (undated) DEVICE — GLV 8 PROTEXIS (WHITE)